# Patient Record
Sex: FEMALE | Race: WHITE | Employment: OTHER
[De-identification: names, ages, dates, MRNs, and addresses within clinical notes are randomized per-mention and may not be internally consistent; named-entity substitution may affect disease eponyms.]

---

## 2017-07-15 ENCOUNTER — HEALTH MAINTENANCE LETTER (OUTPATIENT)
Age: 62
End: 2017-07-15

## 2022-10-13 ENCOUNTER — PATIENT OUTREACH (OUTPATIENT)
Dept: GERIATRIC MEDICINE | Facility: CLINIC | Age: 67
End: 2022-10-13

## 2022-10-13 NOTE — LETTER
October 13, 2022    LEXI AMOR  [address] [facility]  [place] [address]    Dear  Lexi,    Welcome to [facility] s MSC+ health program. My name is BRYON Rehman. I am your MSC+ care coordinator. You are eligible for Care Coordination through [facility] MSC+ pla.    As your care coordinator, we ll:    Meet to go over your care coordination benefits    Talk about your physical and mental health care needs     Review your preventative care needs    Create a plan that meets your needs with the services you choose    What happens next?  I ll call you soon to introduce myself and tell you more about my role. We ll then plan time to go over your health and safety needs. Our goal is to keep you as healthy and independent as possible.    Soon, you will receive a new MSC+ member identification (ID) card from [facility]. When you receive it, please use this card along with your [place] [facility] card and Prescription Drug Coverage Program card. When you receive, it please use this card where you get your health services. If you have Medicare, you will need to show your Medicare card when you get health services.    The MSC+ care coordination program is voluntary and offered to you at no cost. If you wish to stop being in the care coordination program or have questions, call me at [phone]. If you reach my voicemail, leave a message and your phone number. TTY users, call the [place] [employer] at 855 or 1-781.926.3029 (taxdfu-fi-pbvpyo relay service).    Sincerely,      BRYON Rehman    E-mail: [email or web address]  Phone: [phone]      [employer]    [id number] accepted   [id number]       [id number] (07/2022)

## 2022-10-13 NOTE — PROGRESS NOTES
[facility] Care Coordination Contact    Member [facility] on 10[facility]/2022 with [facility] MSC+.  Previous Health Plan: [facility] MSC+  Previous Care System: [facility]  Previous care coordinators name and number: n/a - prev PMAP through [facility]  Waiver Type: N/A  Last MMIS Entry: Date n/a and Type n/a  MMIS visit date (and type) if different from above: n/a  Services Listed in MMIS: n/a  UTF received: No UTF to request    Not established notes: Per member she moved to [facility], cancelled [facility]. Per CM intake, Remain with [facility]    Whitney Vergara  Care Management Specialist  [facility]  [phone]

## 2022-10-17 ENCOUNTER — PATIENT OUTREACH (OUTPATIENT)
Dept: GERIATRIC MEDICINE | Facility: CLINIC | Age: 67
End: 2022-10-17

## 2022-10-17 NOTE — PROGRESS NOTES
[facility] Care Coordination Contact      [facility] Refusal Telephone Assessment    Member refused home visit HRA on 10/17/22 (reason: Radha moved to [place], [place] and should be taken off of [facility]. She didn't see validity of a visit).    ER visits: No  Hospitalizations: No  Health concerns: No major issues   Falls/Injuries: No  ADL/IADL Dependencies: Independent         Member currently receiving the following home care services:  None   Member currently receiving the following community resources:  None   Informal support(s):  None     Advanced Care Planning discussion, complete code section.    [facility] sponsored benefits: Shared information re: Silver [employer]/gym memberships, ASA, Calcium +D.    Follow-Up Plan: Member informed of future contact, plan to f/u with member with a 6 month telephone assessment and offer a home visit.  Contact information shared with member and family, encouraged member to call with any questions or concerns at any time.    Unable to route note to PCP as there is no active one on file.     Radha stated she moved to [place], [place] recently. She has been in contact with [facility] and has updated her address. She is supposed to be taken off of [facility] as she signed up for a plan with [facility].   She will spend the holidays with family and then go down to [place] for 2-3 months in January.     BRYON Rehman, Manager   [facility]  [phone]

## 2022-10-24 ENCOUNTER — PATIENT OUTREACH (OUTPATIENT)
Dept: GERIATRIC MEDICINE | Facility: CLINIC | Age: 67
End: 2022-10-24

## 2022-10-24 NOTE — PROGRESS NOTES
"[facility] Care Coordination Contact    Per CC, mailed client a \"Refusal of Home Visit\" letter.    Ashish Ybarra  Care Management Specialist  [facility]  [phone]    "

## 2022-10-24 NOTE — LETTER
October 24, 2022    LEXI AMOR  [address] [facility] [place] [address]        Dear Lexi:    As a member of [facility]'s MSC+ program, we offer a health risk assessment at no cost to you. I know you don't want to have the assessment right now. If you change your mind, please call me at the number below.    Who performs the health risk assessment?  A [facility] Care Coordinator performs the assessment. Our Care Coordinators can also help you understand your benefits. They can tell you about services to aid you at home, such as managing your care with your doctors if your health worsens.    Our Care Coordinators are here for you if you need:    Support for activities you used to do by yourself (including making meals, bathing and paying bills)    Equipment for bathroom or home safety    Help finding a new place to live    Information on staying healthy, preventing falls and immunizations    Questions?  If you have questions, or you would like to do he assessment, call me at [phone]. TTY users call [phone]. I'm here from 8am to 5pm. I may reach out to you again soon.       Sincerely,         BRYON Rehman    E-mail: [email or web address]  Phone: [phone]      [employer]      \<[id number] accepted  [id number]>    [id number] (21/2021)

## 2022-12-08 ENCOUNTER — PATIENT OUTREACH (OUTPATIENT)
Dept: GERIATRIC MEDICINE | Facility: CLINIC | Age: 67
End: 2022-12-08

## 2022-12-08 NOTE — PROGRESS NOTES
[facility] Care Coordination Contact    No longer active with [facility] community case management effective 12/1/22.  Reason for community disenrollment: Change Care System/PCC to [facility] in [place], [place]      BRYON Rehman, Manager   [facility]  [phone]

## 2022-12-15 NOTE — PROGRESS NOTES
Disenrollment processed and documents sent to [facility] in case member's new entity request in the future p/CC.    Ashish Ybarra  Care Management Specialist  [facility]  [phone]